# Patient Record
Sex: FEMALE | Race: OTHER | NOT HISPANIC OR LATINO
[De-identification: names, ages, dates, MRNs, and addresses within clinical notes are randomized per-mention and may not be internally consistent; named-entity substitution may affect disease eponyms.]

---

## 2021-11-10 ENCOUNTER — APPOINTMENT (OUTPATIENT)
Dept: PULMONOLOGY | Facility: CLINIC | Age: 56
End: 2021-11-10
Payer: MEDICAID

## 2021-11-10 VITALS
DIASTOLIC BLOOD PRESSURE: 86 MMHG | BODY MASS INDEX: 22.66 KG/M2 | TEMPERATURE: 98.7 F | OXYGEN SATURATION: 100 % | SYSTOLIC BLOOD PRESSURE: 130 MMHG | HEART RATE: 62 BPM | HEIGHT: 61 IN | WEIGHT: 120 LBS

## 2021-11-10 PROCEDURE — 94727 GAS DIL/WSHOT DETER LNG VOL: CPT

## 2021-11-10 PROCEDURE — 99203 OFFICE O/P NEW LOW 30 MIN: CPT | Mod: 25

## 2021-11-10 PROCEDURE — 94729 DIFFUSING CAPACITY: CPT

## 2021-11-10 PROCEDURE — 94060 EVALUATION OF WHEEZING: CPT

## 2021-11-10 RX ORDER — SODIUM CHLORIDE/ALOE VERA
SPRAY, NON-AEROSOL (ML) NASAL
Qty: 1 | Refills: 3 | Status: ACTIVE | COMMUNITY
Start: 2021-11-10 | End: 1900-01-01

## 2021-11-10 RX ORDER — FLUTICASONE FUROATE AND VILANTEROL TRIFENATATE 100; 25 UG/1; UG/1
100-25 POWDER RESPIRATORY (INHALATION) DAILY
Qty: 1 | Refills: 3 | Status: ACTIVE | COMMUNITY
Start: 2021-11-10 | End: 1900-01-01

## 2021-11-10 RX ORDER — FEXOFENADINE HCL 60 MG/1
60 TABLET, FILM COATED ORAL
Qty: 60 | Refills: 1 | Status: ACTIVE | COMMUNITY
Start: 2021-11-10 | End: 1900-01-01

## 2021-11-10 RX ORDER — ALBUTEROL SULFATE 90 UG/1
108 (90 BASE) INHALANT RESPIRATORY (INHALATION)
Qty: 1 | Refills: 3 | Status: ACTIVE | COMMUNITY
Start: 2021-11-10 | End: 1900-01-01

## 2021-11-10 NOTE — PHYSICAL EXAM
[No Acute Distress] : no acute distress [Normal Oropharynx] : normal oropharynx [I] : Mallampati Class: I [Normal Appearance] : normal appearance [Supple] : supple [No Neck Mass] : no neck mass [Normal Rate/Rhythm] : normal rate/rhythm [Normal S1, S2] : normal s1, s2 [No Resp Distress] : no resp distress [Clear to Auscultation Bilaterally] : clear to auscultation bilaterally [No Abnormalities] : no abnormalities [Benign] : benign [Not Tender] : not tender [No Masses] : no masses [Soft] : soft [No Clubbing] : no clubbing [No Edema] : no edema [Oriented x3] : oriented x3 [Normal Affect] : normal affect

## 2021-11-11 NOTE — DISCUSSION/SUMMARY
[FreeTextEntry1] : Dyspnea etiology unclear\par rhinitis sinus symptoms\par \par PFT  suggests obstructive pattern but also restriction\par \par allergies \par \par PLAN\par \par start on fexofenadine 60 mg BID\par \par Trial Breo once daily\par \par \par albuterol MDI as needed \par \par saline nasal wash\par \par Next visit discuss/consider imaging for restrictive addi disease\par \par Further recommendations based on results. \par \par \par Deepak Carrillo MD

## 2021-11-11 NOTE — PROCEDURE
[FreeTextEntry1] : \par PFT  demonstrates restriction with diminished total lung capacity.\par FEV1 and FVC are diminished with normal ratio.\par \par there is diminished flow at level of small airways\par \par RV/TLC is elevated, suggesting air trapping\par \par The DLCO is diminished  DLCO/VA is normal \par \par RESTRICTION AND OBSTRUCTION\par \par Deepak Carrillo MD\par \par  \par

## 2021-11-11 NOTE — HISTORY OF PRESENT ILLNESS
[TextBox_4] : 56 year old patient presents for evaluation of asthma, reportedly diagnosed last year.\par \par She has chronic shortness of breath \par \par \par She has dyspnea that comes on at times.  She has cough, usually dry.\par \par She uses Symbicort 160/4.5 2 puffs twice per day.\par \par albuterol MDI as needed and albuterol MDI \par \par She has nebulizer\par \par \par Primary doctor is Charmaine Sheppard\par \par \par PSH:\par \par none\par \par \par PMH:\par seasonal allergy, multiple allergies\par \par sinus\par \par asthma\par \par arthritis of spine, "everywhere"\par \par \par \par SH:\par \par never smoker\par \par \par ETOH:  rare\par \par \par 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\par \par \par \par \par \par ALLERGY:\par \par NKDA\par \par environmental/seasonal allergy: multiple allergies\par \par \par \par Review of Systems:\par \par has skin problems\par \par has sinus problems\par \par \par \par no pneumonia\par \par no lung cancer\par \par irregular heartbeat\par \par no HTN\par no edema\par \par some GI burning \par no liver disease\par \par no Diabetes\par no thyroid disease\par no hyperlipidemia\par \par \par no bleeding\par \par no DVT or PE\par \par no kidney disease\par \par no stroke\par no seizure\par \par \par \par \par \par \par \par \par \par \par \par   [Hypersomnolence] : denies hypersomnolence [Snoring] : no snoring [Unintentional Sleep while Inactive] : no unintentional sleep while inactive

## 2021-12-21 ENCOUNTER — APPOINTMENT (OUTPATIENT)
Dept: PULMONOLOGY | Facility: CLINIC | Age: 56
End: 2021-12-21
Payer: MEDICAID

## 2021-12-21 VITALS
BODY MASS INDEX: 22.3 KG/M2 | HEART RATE: 65 BPM | WEIGHT: 118 LBS | TEMPERATURE: 98 F | SYSTOLIC BLOOD PRESSURE: 138 MMHG | OXYGEN SATURATION: 94 % | DIASTOLIC BLOOD PRESSURE: 94 MMHG

## 2021-12-21 PROCEDURE — 99214 OFFICE O/P EST MOD 30 MIN: CPT

## 2021-12-21 NOTE — HISTORY OF PRESENT ILLNESS
[TextBox_4] : 56 year old patient presents for evaluation of asthma, reportedly diagnosed last year.\par \par She has chronic shortness of breath \par \par \par She has dyspnea that comes on at times.  She has cough, usually dry.\par \par She used Breo  but noted difficulty focussing, lethargy.  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\par \par \par \par \par \par ALLERGY:\par \par NKDA\par \par environmental/seasonal allergy: multiple allergies\par \par \par \par Review of Systems:\par \par has skin problems\par \par has sinus problems\par \par \par \par no pneumonia\par \par no lung cancer\par \par irregular heartbeat\par \par no HTN\par no edema\par \par some GI burning \par no liver disease\par \par no Diabetes\par no thyroid disease\par no hyperlipidemia\par \par \par no bleeding\par \par no DVT or PE\par \par no kidney disease\par \par no stroke\par no seizure\par \par \par \par \par \par \par \par \par \par \par \par   [Hypersomnolence] : denies hypersomnolence [Snoring] : no snoring [Unintentional Sleep while Inactive] : no unintentional sleep while inactive

## 2021-12-21 NOTE — DISCUSSION/SUMMARY
[FreeTextEntry1] : Dyspnea etiology unclear, suspect obstruction, based on benefit from albuterol\par rhinitis sinus symptoms\par \par PFT suggests obstructive pattern but also restriction\par \par allergies \par \par PLAN\par \par Advised to stop cetirizine and instead  use fexofenadine 60 mg BID, as needed\par \par advise to try  Breo once daily, with albuterol MDI as needed \par \par should use saline nasal wash\par \par refer to ENT, she uses Flonase prescribed by her doctor\par \par Next visit discuss/consider imaging for restrictive addi disease\par \par Further recommendations based on results. \par

## 2022-02-08 ENCOUNTER — APPOINTMENT (OUTPATIENT)
Dept: PULMONOLOGY | Facility: CLINIC | Age: 57
End: 2022-02-08
Payer: MEDICAID

## 2022-02-08 VITALS
WEIGHT: 117 LBS | OXYGEN SATURATION: 95 % | HEART RATE: 69 BPM | SYSTOLIC BLOOD PRESSURE: 156 MMHG | DIASTOLIC BLOOD PRESSURE: 100 MMHG | TEMPERATURE: 98.6 F | BODY MASS INDEX: 22.11 KG/M2

## 2022-02-08 PROCEDURE — 99213 OFFICE O/P EST LOW 20 MIN: CPT

## 2022-02-08 NOTE — DISCUSSION/SUMMARY
----- Message from Irene Hein sent at 3/7/2017  9:48 AM CST -----  Gave [facility]ann approval letter for methylphenidate 54 mg ER.   [FreeTextEntry1] : Dyspnea of etiology unclear, suspect obstructive airways disease\par \par Reports  history of allergies and rhinitis and sinus symptoms\par \par She s using Breo with some benefit (ICS/LABA)\par \par PFT suggests obstructive pattern but also restriction\par \par Plan\par \par continue Breo daily\par \par use fexofenadine\par \par saline nasal wash\par \par Allergist evaluation\par Will also consider ENT evaluation if unimproved\par \par \par Further recommendations based on results. \par \par SLEEP\par No snoring, witnessed apnea, excessive daytime sleepiness, morning headache \par \par Deepak Carrillo MD \par

## 2022-02-08 NOTE — HISTORY OF PRESENT ILLNESS
[TextBox_4] : 56 year old patient presents for evaluation of asthma, reportedly diagnosed last year.\par \par She has chronic shortness of breath \par \par \par She has dyspnea that comes on at times.  She has cough, usually dry.\par She has throat clearing. 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\par \par \par \par \par \par ALLERGY:\par \par NKDA\par \par environmental/seasonal allergy: multiple allergies\par \par \par \par Review of Systems:\par \par has skin problems\par \par has sinus problems\par \par \par \par no pneumonia\par \par no lung cancer\par \par irregular heartbeat\par \par no HTN\par no edema\par \par some GI burning \par no liver disease\par \par no Diabetes\par no thyroid disease\par no hyperlipidemia\par \par \par no bleeding\par \par no DVT or PE\par \par no kidney disease\par \par no stroke\par no seizure\par \par \par \par \par \par \par \par \par \par \par \par   [Hypersomnolence] : denies hypersomnolence [Unintentional Sleep while Inactive] : no unintentional sleep while inactive

## 2022-02-08 NOTE — DISCUSSION/SUMMARY
[FreeTextEntry1] : Dyspnea of etiology unclear, suspect obstructive airways disease\par \par Reports  history of allergies and rhinitis and sinus symptoms\par \par She s using Breo with some benefit (ICS/LABA)\par \par PFT suggests obstructive pattern but also restriction\par \par Plan\par \par continue Breo daily\par \par use fexofenadine\par \par saline nasal wash\par \par Allergist evaluation\par Will also consider ENT evaluation if unimproved\par \par \par Further recommendations based on results. \par \par SLEEP\par No snoring, witnessed apnea, excessive daytime sleepiness, morning headache \par \par Deepak Carrillo MD \par

## 2022-04-12 ENCOUNTER — APPOINTMENT (OUTPATIENT)
Dept: PULMONOLOGY | Facility: CLINIC | Age: 57
End: 2022-04-12
Payer: MEDICAID

## 2022-04-12 VITALS
HEART RATE: 72 BPM | OXYGEN SATURATION: 96 % | DIASTOLIC BLOOD PRESSURE: 90 MMHG | SYSTOLIC BLOOD PRESSURE: 142 MMHG | TEMPERATURE: 98.4 F | WEIGHT: 121 LBS | BODY MASS INDEX: 22.86 KG/M2

## 2022-04-12 DIAGNOSIS — R05.8 OTHER SPECIFIED COUGH: ICD-10-CM

## 2022-04-12 DIAGNOSIS — J44.9 CHRONIC OBSTRUCTIVE PULMONARY DISEASE, UNSPECIFIED: ICD-10-CM

## 2022-04-12 PROCEDURE — 99214 OFFICE O/P EST MOD 30 MIN: CPT

## 2022-04-12 NOTE — DISCUSSION/SUMMARY
[FreeTextEntry1] : Dyspnea, etiology unclear, suspect obstructive airways disease, using Breo for possible OAD with some relief.\par \par Reports  history of allergies and rhinitis and sinus symptoms, chronic cough. Allergy to pollen on testing by allergist\par \par seems to have postnasal drip\par \par PFT suggests obstructive pattern but mostly restriction\par \par She is using Flonase, fexofenadine, Breo\par \par Plan\par \par continue Breo daily\par \par use fexofenadine daily\par \par Add saline nasal wash at night\par \par Follow with Allergist \par \par I have referred her for ENT evaluation  for likely post nasal drip that seem to be the cause of cough\par \par Have considered trial of H2 antagonist\par \par She states she was given course of oral steroid by her allergist which she has not yet taken.  She is encouraged to try this\par \par \par Further recommendations based on results. \par \par Total time spent : 30 minutes\par Including:\par Preparation prior to visit - Reviewing prior record, results of tests and Consultation Reports as applicable\par Conducting an appropriate H & P during today's encounter\par Appropriate orders for tests, medications and procedures, as applicable\par Counseling patient \par Note completion \par \par Deepak Carrillo MD \par

## 2022-04-12 NOTE — HISTORY OF PRESENT ILLNESS
[TextBox_4] : 56 year old patient presents for evaluation of asthma, reportedly diagnosed last year.\par \par She has chronic shortness of breath \par \par \par She has dyspnea that comes on at times.  She has cough, usually dry.\par She has throat clearing. \par \par \par She has  been using Breo daily as well as albuterol MDI  as needed and antihistamine\par \par She has also been using fluticasone nasal spray and oral fexofenadine\par \par She has followed with her allergist, testing demonstrated allergy to pollen\par \par \par \par \par Primary doctor is Charmaine Sheppard\par \par Allergist is Dr Ramirez Frost \par \par \par PSH:\par \par none\par \par \par PMH:\par seasonal allergy, multiple allergies\par \par sinus\par \par asthma\par \par arthritis of spine, "everywhere"\par \par \par \par SH:\par \par never smoker\par \par \par ETOH:  rare\par \par \par 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\par \par \par \par \par \par ALLERGY:\par \par NKDA\par \par environmental/seasonal allergy: multiple allergies\par \par \par \par Review of Systems:\par \par has skin problems\par \par has sinus problems\par \par \par \par no pneumonia\par \par no lung cancer\par \par irregular heartbeat\par \par no HTN\par no edema\par \par some GI burning \par no liver disease\par \par no Diabetes\par no thyroid disease\par no hyperlipidemia\par \par \par no bleeding\par \par no DVT or PE\par \par no kidney disease\par \par no stroke\par no seizure\par \par \par \par \par \par \par \par \par \par \par \par   [Hypersomnolence] : denies hypersomnolence [Unintentional Sleep while Inactive] : no unintentional sleep while inactive

## 2022-05-05 ENCOUNTER — APPOINTMENT (OUTPATIENT)
Dept: OTOLARYNGOLOGY | Facility: CLINIC | Age: 57
End: 2022-05-05
Payer: MEDICAID

## 2022-05-05 VITALS
WEIGHT: 117 LBS | HEIGHT: 61 IN | TEMPERATURE: 98 F | BODY MASS INDEX: 22.09 KG/M2 | SYSTOLIC BLOOD PRESSURE: 126 MMHG | HEART RATE: 67 BPM | DIASTOLIC BLOOD PRESSURE: 75 MMHG

## 2022-05-05 DIAGNOSIS — R05.3 CHRONIC COUGH: ICD-10-CM

## 2022-05-05 DIAGNOSIS — J34.3 HYPERTROPHY OF NASAL TURBINATES: ICD-10-CM

## 2022-05-05 DIAGNOSIS — K21.9 GASTRO-ESOPHAGEAL REFLUX DISEASE W/OUT ESOPHAGITIS: ICD-10-CM

## 2022-05-05 DIAGNOSIS — J34.2 DEVIATED NASAL SEPTUM: ICD-10-CM

## 2022-05-05 DIAGNOSIS — J32.4 CHRONIC PANSINUSITIS: ICD-10-CM

## 2022-05-05 DIAGNOSIS — R06.00 DYSPNEA, UNSPECIFIED: ICD-10-CM

## 2022-05-05 PROCEDURE — 99204 OFFICE O/P NEW MOD 45 MIN: CPT | Mod: 25

## 2022-05-05 PROCEDURE — 31231 NASAL ENDOSCOPY DX: CPT

## 2022-05-05 RX ORDER — OMEPRAZOLE 40 MG/1
40 CAPSULE, DELAYED RELEASE ORAL
Qty: 1 | Refills: 2 | Status: ACTIVE | COMMUNITY
Start: 2022-05-05 | End: 1900-01-01

## 2022-05-05 RX ORDER — FLUTICASONE PROPIONATE 50 UG/1
50 SPRAY, METERED NASAL DAILY
Qty: 1 | Refills: 3 | Status: ACTIVE | COMMUNITY
Start: 2022-05-05 | End: 1900-01-01

## 2022-05-05 NOTE — HISTORY OF PRESENT ILLNESS
[de-identified] : chronic throat clearing, looses voice\par cough \par \par has asthma and sent here for sinuses\par \par sinus pressure x15 years - panisnus - does generalize as well sometime\par daily for the past two years- seems constant \par does prednisone - helped for a bit, is two weeks out from it and feels has helped\par has gotten abx - does not feel it helps\par on multiple nasal sprays - flonase - don’t seem to fix the issue \par + congestion b/l - alternates as well\par + nasal d/c\par + throat clearing

## 2022-05-05 NOTE — ASSESSMENT
[FreeTextEntry1] : pt with chronic sinusitis mild NSD and BITH LPR with significant asthma - is SOb at baseline and coughing, if can support lung in any way will see if can help\par will proceed to start lifestyle regiment to reduce overproduction of acid and reduce laryngeal reflux including avoiding caffein, alcohol, eating before bed, spicy and fatty foods, and head elevation at night etc. Handout detailing regiment also given\par PPI\par CT scan of the sinuses \par flonase\par

## 2022-05-05 NOTE — CONSULT LETTER
[FreeTextEntry1] : Dear Dr. PRESTON PARHAM \par I had the pleasure of evaluating your patient LISA BLANC, thank you for allowing us to participate in their care. please see full note detailing our visit below.\par If you have any questions, please do not hesitate to call me and I would be happy to discuss further. \par \par Cb Schmitt M.D.\par Attending Physician,  \par Department of Otolaryngology - Head and Neck Surgery\par On license of [facility] \par Office: [phone]\par Fax: [phone]\par \par

## 2022-05-05 NOTE — PROCEDURE

## 2022-06-02 ENCOUNTER — NON-APPOINTMENT (OUTPATIENT)
Age: 57
End: 2022-06-02

## 2022-07-12 ENCOUNTER — NON-APPOINTMENT (OUTPATIENT)
Age: 57
End: 2022-07-12

## 2022-07-12 ENCOUNTER — APPOINTMENT (OUTPATIENT)
Dept: PULMONOLOGY | Facility: CLINIC | Age: 57
End: 2022-07-12

## 2022-07-12 VITALS
TEMPERATURE: 98.2 F | DIASTOLIC BLOOD PRESSURE: 80 MMHG | BODY MASS INDEX: 22.48 KG/M2 | SYSTOLIC BLOOD PRESSURE: 122 MMHG | WEIGHT: 119 LBS | HEART RATE: 67 BPM | OXYGEN SATURATION: 97 %

## 2022-12-14 ENCOUNTER — NON-APPOINTMENT (OUTPATIENT)
Age: 57
End: 2022-12-14

## 2022-12-14 ENCOUNTER — APPOINTMENT (OUTPATIENT)
Dept: OPHTHALMOLOGY | Facility: CLINIC | Age: 57
End: 2022-12-14

## 2022-12-14 PROCEDURE — 92020 GONIOSCOPY: CPT

## 2022-12-14 PROCEDURE — 92004 COMPRE OPH EXAM NEW PT 1/>: CPT

## 2022-12-14 PROCEDURE — 92133 CPTRZD OPH DX IMG PST SGM ON: CPT

## 2022-12-27 ENCOUNTER — NON-APPOINTMENT (OUTPATIENT)
Age: 57
End: 2022-12-27

## 2022-12-27 ENCOUNTER — APPOINTMENT (OUTPATIENT)
Dept: OPHTHALMOLOGY | Facility: CLINIC | Age: 57
End: 2022-12-27
Payer: MEDICAID

## 2022-12-27 PROCEDURE — 99214 OFFICE O/P EST MOD 30 MIN: CPT

## 2022-12-27 PROCEDURE — 92285 EXTERNAL OCULAR PHOTOGRAPHY: CPT

## 2022-12-29 ENCOUNTER — APPOINTMENT (OUTPATIENT)
Dept: OPHTHALMOLOGY | Facility: CLINIC | Age: 57
End: 2022-12-29

## 2022-12-30 ENCOUNTER — APPOINTMENT (OUTPATIENT)
Dept: ORTHOPEDIC SURGERY | Facility: CLINIC | Age: 57
End: 2022-12-30
Payer: MEDICAID

## 2022-12-30 VITALS — WEIGHT: 121 LBS | HEIGHT: 61 IN | BODY MASS INDEX: 22.84 KG/M2

## 2022-12-30 DIAGNOSIS — M25.511 PAIN IN RIGHT SHOULDER: ICD-10-CM

## 2022-12-30 PROCEDURE — 73030 X-RAY EXAM OF SHOULDER: CPT | Mod: RT

## 2022-12-30 PROCEDURE — 99204 OFFICE O/P NEW MOD 45 MIN: CPT

## 2023-01-01 NOTE — HISTORY OF PRESENT ILLNESS
[de-identified] : LISA is a 56yo F who presents for evaluation of right shoulder pain. The pain began about 4 days ago and is located on anterior shoulder and radiates down the arm with certain movements. No specific inciting injury or history of trauma, but patient states that she was doing a lot of cooking and housework over the holidays. She reports numbness and tingling in the right hand and fingers especially 1-3 digits. She notes chronic neck pain. Went to urgent care yesterday and was prescribed medrol dose gisella was given a sling. No XR taken at the time. Started steroids this morning - has had some relief with medrol and tylenol and with hot water. ROM limited by mostly by pain. She denies weakness in the right upper extremity and does not report an increase in her chronic neck pain at this time.

## 2023-01-01 NOTE — DISCUSSION/SUMMARY
[de-identified] : LISA is a 56yo F who presents today for evaluation of right shoulder pain. XR shoulder taken at today's visit was unremarkable - results discussed with patient. Her clinical history and exam are consistent with rotator cuff tendinitis. The risks and benefits of potential treatment options including conservative therapy and injections were discussed with the patient at today's visit. She was counselled to continue her current steroid taper and provided with a referral for PT and shoulder MRI. Also concern for cervical radiculopathy stenosis vs. radiculopathy given possible radicular pain with diffusely symmetric brisk reflexes.  Will provide referral to ortho spine for further evaluation of C-spine. Patient will RTC following MRI for review of images. \par \par The above history, exam, and plan was discussed with the attending physician.\par \par Kaden Goodwin MD

## 2023-01-01 NOTE — PHYSICAL EXAM
[de-identified] : Gen: in no acute distress, seated comfortably, moving easily\par Skin: No discoloration, rashes; on palpation skin is dry, \par Neuro: Normal sensation all dermatomes, motor all myotomes\par Vascular: Normal pulses, no edema, normal temperature\par Coordination and balance: Normal\par Psych: normal mood and affect, non pressured speech, alert and oriented \par \par Right Shoulder:\par APPEARANCE: no marked deformities, no swelling or malalignment\par POSITIVE TENDERNESS: pec major tendon, biceps tendon, lateral shoulder/SS\par ROM: full active and passive shoulder ROM with pain at end abduction\par RESISTIVE TESTING: strength exam limited by pain 4/5 SA/SF\par SPECIAL TESTS: neg Drop Arm, pos Empty Can, pos Daniels/Neers, pos Speed's \par Neuro: DTRs slightly brisk throughout 3+/4 biceps, triceps, brachioradialis +Truong's bilaterally\par Sensation: Intact to light touch throughout\par B/L Elbows: No asymmetry, malalignment, or swelling, Joints stable\par B/L Wrist and Hand: No asymmetry, malalignment, or swelling, Joints stable  [de-identified] : \par The following radiographs were ordered and read by me during this patient's visit. I reviewed each radiograph in detail with the patient and discussed the findings as highlighted below. \par \par 3 views of the right shoulder were obtained today that show no fracture, or dislocation.

## 2023-01-01 NOTE — END OF VISIT
[] : Resident [FreeTextEntry3] : Mary Jane Ortiz MD, EdM\par Sports Medicine [facility]\par Department of Orthopedics

## 2023-01-06 ENCOUNTER — TRANSCRIPTION ENCOUNTER (OUTPATIENT)
Age: 58
End: 2023-01-06

## 2023-01-06 ENCOUNTER — APPOINTMENT (OUTPATIENT)
Dept: ORTHOPEDIC SURGERY | Facility: CLINIC | Age: 58
End: 2023-01-06
Payer: MEDICAID

## 2023-01-06 VITALS
WEIGHT: 121 LBS | BODY MASS INDEX: 22.84 KG/M2 | HEIGHT: 61 IN | SYSTOLIC BLOOD PRESSURE: 157 MMHG | TEMPERATURE: 97.6 F | DIASTOLIC BLOOD PRESSURE: 92 MMHG | HEART RATE: 63 BPM | OXYGEN SATURATION: 99 %

## 2023-01-06 DIAGNOSIS — M54.12 RADICULOPATHY, CERVICAL REGION: ICD-10-CM

## 2023-01-06 PROCEDURE — 72050 X-RAY EXAM NECK SPINE 4/5VWS: CPT

## 2023-01-06 PROCEDURE — 99214 OFFICE O/P EST MOD 30 MIN: CPT

## 2023-01-06 RX ORDER — PANTOPRAZOLE 40 MG/1
40 TABLET, DELAYED RELEASE ORAL DAILY
Qty: 30 | Refills: 0 | Status: ACTIVE | COMMUNITY
Start: 2023-01-06 | End: 1900-01-01

## 2023-01-06 RX ORDER — DICLOFENAC SODIUM 75 MG/1
75 TABLET, DELAYED RELEASE ORAL
Qty: 40 | Refills: 0 | Status: ACTIVE | COMMUNITY
Start: 2023-01-06 | End: 1900-01-01

## 2023-01-06 NOTE — ASSESSMENT
[FreeTextEntry1] : I had a lengthy discussion with the patient in regards to the treatment plan and diagnosis.  The patient does have objective weakness findings on my exam.  Furthermore I am concerned in regards to compression along the patient's spinal cord and/or nerve roots.  As a result I would like to proceed with a MRI of the patient's cervical spine.  In tandem with this the patient should begin physical therapy focused on their neck.  I will have the patient follow-up in 3 to 4 weeks for repeat clinical evaluation.  I encouraged the patient to reach out to me directly at any point if their symptoms worsen or change in any way.\par \par The patient has tried the following treatments:\par Activity modification          +\par Ice/Compression                  +\par Braces                                     -\par NSAIDS                                   +\par Physical Therapy                   +\par \par Please note above modalities for over 6+ weeks over the past 2 months

## 2023-01-06 NOTE — PHYSICAL EXAM
[de-identified] : Cervical Physical Exam\par \par Gait - Normal\par \par Station - Normal\par \par Sagittal balance - Normal \par \par Compensatory mechanism? - None\par \par Horizontal gaze - Maintained\par \par Reflexes\par Biceps - hr\par Triceps - Normal\par Brachioradialis - Normal\par Patellar - Normal\par Gastroc - Normal\par Clonus -No\par \par Truong´s - None\par \par Shoulder Exam - Normal\par \par Spurling´s - None\par \par Wrist Pulses -2+ radial/ulnar\par \par Foot Pulses -2+ DP/PT\par \par Cervical range of motion - Normal\par \par Sensation \par C5-T1 sensation intact to light touch bilaterally\par \par L1-S1 sensation intact to light touch bilaterally\par \par Motor\par \par \par 	Deltoid	Biceps	Triceps	WF	WE	IO	\par Right	5/5	5/5	3+/5	5/5	5/5	4+/5	4/5\par Left	5/5	5/5	5/5	5/5	5/5	5/5	5/5\par \par \par 	IP	Quad	HS	TA	Gastroc	EHL\par Right	5/5	5/5	5/5	5/5	5/5	5/5\par Left	5/5	5/5	5/5	5/5	5/5	5/5 [de-identified] : Cervical radiographs\par Disc degeneration and\par Facet arthropathy noted\par No obvious instability

## 2023-01-06 NOTE — HISTORY OF PRESENT ILLNESS
[de-identified] : This is a 57-year-old female that is here today for evaluation of her neck back and right arm symptoms.  She states that her right arm symptoms began approximately 2 to 3 weeks ago.  She has been dealing with acute on chronic worsening back and neck symptoms.  She states that she does have right arm pain.  This pain does go down her lateral arm into her forearm and into her thumb index finger, ring finger and middle finger.  She denies any issues with bowel bladder function.  She denies any issues with saddle anesthesia.  She denies any issues with balance.

## 2023-01-10 ENCOUNTER — NON-APPOINTMENT (OUTPATIENT)
Age: 58
End: 2023-01-10

## 2023-01-10 ENCOUNTER — APPOINTMENT (OUTPATIENT)
Dept: OPHTHALMOLOGY | Facility: CLINIC | Age: 58
End: 2023-01-10
Payer: MEDICAID

## 2023-01-10 PROCEDURE — 67800 REMOVE EYELID LESION: CPT | Mod: E3

## 2023-01-10 PROCEDURE — 92285 EXTERNAL OCULAR PHOTOGRAPHY: CPT | Mod: RT

## 2023-01-12 ENCOUNTER — APPOINTMENT (OUTPATIENT)
Dept: MRI IMAGING | Facility: CLINIC | Age: 58
End: 2023-01-12
Payer: MEDICAID

## 2023-01-12 ENCOUNTER — OUTPATIENT (OUTPATIENT)
Dept: OUTPATIENT SERVICES | Facility: HOSPITAL | Age: 58
LOS: 1 days | End: 2023-01-12
Payer: MEDICAID

## 2023-01-12 DIAGNOSIS — M54.12 RADICULOPATHY, CERVICAL REGION: ICD-10-CM

## 2023-01-12 PROCEDURE — 72141 MRI NECK SPINE W/O DYE: CPT | Mod: 26

## 2023-01-12 PROCEDURE — 72141 MRI NECK SPINE W/O DYE: CPT

## 2023-01-17 ENCOUNTER — NON-APPOINTMENT (OUTPATIENT)
Age: 58
End: 2023-01-17

## 2023-01-24 ENCOUNTER — NON-APPOINTMENT (OUTPATIENT)
Age: 58
End: 2023-01-24

## 2023-01-24 ENCOUNTER — APPOINTMENT (OUTPATIENT)
Dept: OPHTHALMOLOGY | Facility: CLINIC | Age: 58
End: 2023-01-24
Payer: MEDICAID

## 2023-01-24 PROCEDURE — 92285 EXTERNAL OCULAR PHOTOGRAPHY: CPT | Mod: RT

## 2023-01-24 PROCEDURE — 99213 OFFICE O/P EST LOW 20 MIN: CPT

## 2023-01-26 ENCOUNTER — APPOINTMENT (OUTPATIENT)
Dept: OPHTHALMOLOGY | Facility: CLINIC | Age: 58
End: 2023-01-26

## 2023-01-27 ENCOUNTER — NON-APPOINTMENT (OUTPATIENT)
Age: 58
End: 2023-01-27

## 2023-06-16 ENCOUNTER — APPOINTMENT (OUTPATIENT)
Dept: OPHTHALMOLOGY | Facility: CLINIC | Age: 58
End: 2023-06-16

## 2023-07-21 ENCOUNTER — APPOINTMENT (OUTPATIENT)
Dept: OPHTHALMOLOGY | Facility: CLINIC | Age: 58
End: 2023-07-21
Payer: MEDICAID

## 2023-07-21 ENCOUNTER — NON-APPOINTMENT (OUTPATIENT)
Age: 58
End: 2023-07-21

## 2023-07-21 PROCEDURE — 92014 COMPRE OPH EXAM EST PT 1/>: CPT

## 2023-07-21 PROCEDURE — 92133 CPTRZD OPH DX IMG PST SGM ON: CPT

## 2023-08-21 ENCOUNTER — APPOINTMENT (OUTPATIENT)
Dept: OPHTHALMOLOGY | Facility: CLINIC | Age: 58
End: 2023-08-21
Payer: MEDICAID

## 2023-08-21 ENCOUNTER — NON-APPOINTMENT (OUTPATIENT)
Age: 58
End: 2023-08-21

## 2023-08-21 PROCEDURE — 92083 EXTENDED VISUAL FIELD XM: CPT

## 2023-08-21 PROCEDURE — 99214 OFFICE O/P EST MOD 30 MIN: CPT | Mod: 25

## 2023-09-14 ENCOUNTER — EMERGENCY (EMERGENCY)
Facility: HOSPITAL | Age: 58
LOS: 1 days | Discharge: LEFT BEFORE DISCHARGE | End: 2023-09-14
Admitting: EMERGENCY MEDICINE
Payer: MEDICAID

## 2023-09-14 VITALS
RESPIRATION RATE: 28 BRPM | HEART RATE: 61 BPM | HEIGHT: 61 IN | WEIGHT: 117.95 LBS | OXYGEN SATURATION: 98 % | TEMPERATURE: 98 F | SYSTOLIC BLOOD PRESSURE: 164 MMHG | DIASTOLIC BLOOD PRESSURE: 92 MMHG

## 2023-09-14 PROCEDURE — 99283 EMERGENCY DEPT VISIT LOW MDM: CPT | Mod: 25

## 2023-09-14 PROCEDURE — 71045 X-RAY EXAM CHEST 1 VIEW: CPT | Mod: 26

## 2023-09-14 PROCEDURE — 99284 EMERGENCY DEPT VISIT MOD MDM: CPT

## 2023-09-14 PROCEDURE — 93005 ELECTROCARDIOGRAM TRACING: CPT

## 2023-09-14 PROCEDURE — 71045 X-RAY EXAM CHEST 1 VIEW: CPT

## 2023-09-14 NOTE — ED PROVIDER NOTE - OBJECTIVE STATEMENT
58-year-old female history asthma on albuterol/Symbicort presents with shortness of breath.  Patient tested positive for COVID 10 days ago with symptoms of cough, myalgias and fever which improved for Motrin, has had persistent productive cough and shortness of breath worsening the last few days.  Took albuterol this a.m. with minimal relief.  Denies history of asthma admissions.  COVID vaccinated X4.  Denies persistent fever, chills, hemoptysis, chest pain.

## 2023-09-14 NOTE — ED PROVIDER NOTE - PHYSICAL EXAMINATION
Well appearing NAD, mildly tachpneic. Lungs CTA with good air movement speaking in full sentences. RRR. No rashes on visible skin

## 2023-09-14 NOTE — ED PROVIDER NOTE - RAPID ASSESSMENT
58-year-old female history asthma on albuterol/Symbicort presents with shortness of breath.  Patient tested positive for COVID 10 days ago with symptoms of cough, myalgias and fever which improved for Motrin, has had persistent productive cough and shortness of breath worsening the last few days.  Took albuterol this a.m. with minimal relief.  Denies history of asthma admissions.  COVID vaccinated X4.  Denies persistent fever, chills, hemoptysis, chest pain.    tachypneic but lungs clear throughout good air movement     IShiva PA-C saw patient as a rapid assessment initially via telemedicine encounter. The rest of care to be performed by the primary ED team. Receiving team will follow up on labs, analgesia, any clinical imaging, and perform reassessment and disposition of the patient as clinically indicated. All decisions regarding the progression of care will be made at their discretion.

## 2023-09-14 NOTE — ED PROVIDER NOTE - PROGRESS NOTE DETAILS
Benny Zapata, DO: I cosign the chart.  I was available for consultation/supervisory purposes if sought by the [facility] during their rapid assessment, but did not see or examine this patient - as is standard practice in our emergency department. I was not notified of patient's elopement, and pt eloped prior to evaluation by ED physician.

## 2023-09-14 NOTE — ED PROVIDER NOTE - CLINICAL SUMMARY MEDICAL DECISION MAKING FREE TEXT BOX
58-year-old female history asthma on albuterol/Symbicort presents with shortness of breath.  Patient tested positive for COVID 10 days ago with symptoms of cough, myalgias and fever which improved for Motrin, has had persistent productive cough and shortness of breath worsening the last few days.  Took albuterol this a.m. with minimal relief.  Denies history of asthma admissions.  COVID vaccinated X4.  Denies persistent fever, chills, hemoptysis, chest pain. tachypneic on vitals afebrile normotensive spo2 wnl. Well appearing mildly tachypneic with lungs CTA. Suspected acute bronchospasm from recent covid infection but will r/o PNA. pt eloped prior to being given nebulizers by ED team (could not be ordered by qpa in triage). - Shiva Mojica PA-C